# Patient Record
Sex: FEMALE | Employment: OTHER | URBAN - METROPOLITAN AREA
[De-identification: names, ages, dates, MRNs, and addresses within clinical notes are randomized per-mention and may not be internally consistent; named-entity substitution may affect disease eponyms.]

---

## 2017-01-02 ENCOUNTER — HOSPITAL ENCOUNTER (OUTPATIENT)
Age: 77
Discharge: HOME | End: 2017-01-02
Attending: FAMILY MEDICINE

## 2017-01-02 VITALS
TEMPERATURE: 98 F | BODY MASS INDEX: 19.97 KG/M2 | HEIGHT: 64 IN | DIASTOLIC BLOOD PRESSURE: 73 MMHG | WEIGHT: 117 LBS | OXYGEN SATURATION: 98 % | SYSTOLIC BLOOD PRESSURE: 125 MMHG | HEART RATE: 79 BPM | RESPIRATION RATE: 20 BRPM

## 2017-01-02 DIAGNOSIS — S61.012A LACERATION OF LEFT THUMB, INITIAL ENCOUNTER: Primary | ICD-10-CM

## 2017-01-02 PROCEDURE — 99202 OFFICE O/P NEW SF 15 MIN: CPT

## 2017-01-03 NOTE — ED NOTES
Wound care performed to left thumb. Left thumb dressed with xeroform and tube gauze. Patient tolerated well.

## 2017-01-03 NOTE — ED PROVIDER NOTES
Patient Seen in: [address]    History   Patient presents with:  Upper Extremity Injury (musculoskeletal)    Stated Complaint: LEFT THUMB, NEEDS STITCHES    HPI    Patient presents with a laceration to her left thumb nicholas Hypertension Brother      CAD and CABG in 46s   • Heart Disorder Brother    • Hypertension Brother      CAD and CABG in 46s   • Heart Disorder Brother          Smoking Status: Former Smoker                   Packs/Day: 0.00  Years:           Types: Cigaret mannie. Manuel Harp of IP joint was normal.  There were no deep structures involved. No tendon injury was identified. The wound was not a candidate for sutures or glue. Pressure dressing with Xeroform and gauze was applied.     The patient tolerated the procedure

## 2017-01-03 NOTE — ED INITIAL ASSESSMENT (HPI)
Patient states slicing scalloped potatoes and sliced left thumb at 8146 today. Patient denies pain. Patient arrived with finger wrapped in gauze. Patient states moderate amount of bleeding noted to thumb at time of injury.   Patient states being on Plavi

## 2017-02-27 PROBLEM — I10 ESSENTIAL HYPERTENSION: Status: ACTIVE | Noted: 2017-02-27

## 2017-02-27 PROBLEM — H43.811 POSTERIOR VITREOUS DETACHMENT OF RIGHT EYE: Status: ACTIVE | Noted: 2017-02-27

## 2017-08-31 ENCOUNTER — LAB ENCOUNTER (OUTPATIENT)
Dept: LAB | Age: 77
End: 2017-08-31
Attending: INTERNAL MEDICINE
Payer: MEDICARE

## 2017-08-31 DIAGNOSIS — I25.10 CORONARY ATHEROSCLEROSIS OF NATIVE CORONARY ARTERY: Primary | ICD-10-CM

## 2017-08-31 DIAGNOSIS — E78.49 OTHER HYPERLIPIDEMIA: ICD-10-CM

## 2017-08-31 LAB
ALBUMIN SERPL-MCNC: 3.9 G/DL (ref 3.5–4.8)
ALP LIVER SERPL-CCNC: 75 U/L (ref 55–142)
ALT SERPL-CCNC: 37 U/L (ref 14–54)
AST SERPL-CCNC: 26 U/L (ref 15–41)
BASOPHILS # BLD AUTO: 0.01 X10(3) UL (ref 0–0.1)
BASOPHILS NFR BLD AUTO: 0.2 %
BILIRUB SERPL-MCNC: 0.7 MG/DL (ref 0.1–2)
BUN BLD-MCNC: 14 MG/DL (ref 8–20)
CALCIUM BLD-MCNC: 9.1 MG/DL (ref 8.3–10.3)
CHLORIDE: 108 MMOL/L (ref 101–111)
CHOLEST SMN-MCNC: 129 MG/DL (ref ?–200)
CO2: 28 MMOL/L (ref 22–32)
CREAT BLD-MCNC: 0.78 MG/DL (ref 0.55–1.02)
EOSINOPHIL # BLD AUTO: 0.05 X10(3) UL (ref 0–0.3)
EOSINOPHIL NFR BLD AUTO: 1.1 %
ERYTHROCYTE [DISTWIDTH] IN BLOOD BY AUTOMATED COUNT: 13.1 % (ref 11.5–16)
GLUCOSE BLD-MCNC: 88 MG/DL (ref 70–99)
HAV IGM SER QL: 2.1 MG/DL (ref 1.7–3)
HCT VFR BLD AUTO: 43.5 % (ref 34–50)
HDLC SERPL-MCNC: 65 MG/DL (ref 45–?)
HDLC SERPL: 1.98 {RATIO} (ref ?–4.44)
HGB BLD-MCNC: 14 G/DL (ref 12–16)
IMMATURE GRANULOCYTE COUNT: 0.01 X10(3) UL (ref 0–1)
IMMATURE GRANULOCYTE RATIO %: 0.2 %
LDLC SERPL CALC-MCNC: 50 MG/DL (ref ?–130)
LDLC SERPL-MCNC: 14 MG/DL (ref 5–40)
LYMPHOCYTES # BLD AUTO: 1.05 X10(3) UL (ref 0.9–4)
LYMPHOCYTES NFR BLD AUTO: 22.7 %
M PROTEIN MFR SERPL ELPH: 7 G/DL (ref 6.1–8.3)
MCH RBC QN AUTO: 34.1 PG (ref 27–33.2)
MCHC RBC AUTO-ENTMCNC: 32.2 G/DL (ref 31–37)
MCV RBC AUTO: 106.1 FL (ref 81–100)
MONOCYTES # BLD AUTO: 0.47 X10(3) UL (ref 0.1–0.6)
MONOCYTES NFR BLD AUTO: 10.2 %
NEUTROPHIL ABS PRELIM: 3.03 X10 (3) UL (ref 1.3–6.7)
NEUTROPHILS # BLD AUTO: 3.03 X10(3) UL (ref 1.3–6.7)
NEUTROPHILS NFR BLD AUTO: 65.6 %
NONHDLC SERPL-MCNC: 64 MG/DL (ref ?–130)
PLATELET # BLD AUTO: 215 10(3)UL (ref 150–450)
POTASSIUM SERPL-SCNC: 4.4 MMOL/L (ref 3.6–5.1)
RBC # BLD AUTO: 4.1 X10(6)UL (ref 3.8–5.1)
RED CELL DISTRIBUTION WIDTH-SD: 50.8 FL (ref 35.1–46.3)
SODIUM SERPL-SCNC: 143 MMOL/L (ref 136–144)
TRIGLYCERIDES: 69 MG/DL (ref ?–150)
TSI SER-ACNC: 2.18 MIU/ML (ref 0.35–5.5)
WBC # BLD AUTO: 4.6 X10(3) UL (ref 4–13)

## 2017-08-31 PROCEDURE — 80053 COMPREHEN METABOLIC PANEL: CPT

## 2017-08-31 PROCEDURE — 80061 LIPID PANEL: CPT

## 2017-08-31 PROCEDURE — 83735 ASSAY OF MAGNESIUM: CPT

## 2017-08-31 PROCEDURE — 85025 COMPLETE CBC W/AUTO DIFF WBC: CPT

## 2017-08-31 PROCEDURE — 84443 ASSAY THYROID STIM HORMONE: CPT

## 2019-03-25 PROBLEM — H43.811 POSTERIOR VITREOUS DETACHMENT OF RIGHT EYE: Status: RESOLVED | Noted: 2017-02-27 | Resolved: 2019-03-25

## 2019-09-26 PROBLEM — M79.2 NEURALGIA: Status: ACTIVE | Noted: 2019-09-26

## 2023-07-06 ENCOUNTER — OFFICE VISIT (OUTPATIENT)
Dept: WOUND CARE | Facility: HOSPITAL | Age: 83
End: 2023-07-06
Attending: NURSE PRACTITIONER
Payer: MEDICARE

## 2023-07-06 VITALS
SYSTOLIC BLOOD PRESSURE: 118 MMHG | DIASTOLIC BLOOD PRESSURE: 79 MMHG | HEIGHT: 64 IN | BODY MASS INDEX: 18.78 KG/M2 | TEMPERATURE: 98 F | HEART RATE: 71 BPM | RESPIRATION RATE: 16 BRPM | OXYGEN SATURATION: 95 % | WEIGHT: 110 LBS

## 2023-07-06 DIAGNOSIS — L97.922: ICD-10-CM

## 2023-07-06 DIAGNOSIS — C44.729 SQUAMOUS CELL CARCINOMA OF SKIN OF LEFT LOWER EXTREMITY: Primary | ICD-10-CM

## 2023-07-06 PROBLEM — L97.923 NON-PRESSURE CHRONIC ULCER OF LEFT LOWER LEG WITH NECROSIS OF MUSCLE (HCC): Status: ACTIVE | Noted: 2023-07-06

## 2023-07-06 PROCEDURE — 99214 OFFICE O/P EST MOD 30 MIN: CPT

## 2023-07-12 ENCOUNTER — NURSE ONLY (OUTPATIENT)
Dept: WOUND CARE | Facility: HOSPITAL | Age: 83
End: 2023-07-12
Attending: NURSE PRACTITIONER
Payer: MEDICARE

## 2023-07-12 VITALS
RESPIRATION RATE: 20 BRPM | HEART RATE: 94 BPM | TEMPERATURE: 98 F | OXYGEN SATURATION: 98 % | DIASTOLIC BLOOD PRESSURE: 89 MMHG | SYSTOLIC BLOOD PRESSURE: 142 MMHG

## 2023-07-12 DIAGNOSIS — L97.922: Primary | ICD-10-CM

## 2023-07-12 PROCEDURE — 97608 NEG PRS WND THER NDME>50SQCM: CPT

## 2023-07-15 ENCOUNTER — HOSPITAL ENCOUNTER (EMERGENCY)
Facility: HOSPITAL | Age: 83
Discharge: HOME | End: 2023-07-15
Attending: EMERGENCY MEDICINE
Payer: MEDICARE

## 2023-07-15 VITALS
HEART RATE: 92 BPM | DIASTOLIC BLOOD PRESSURE: 75 MMHG | TEMPERATURE: 99 F | HEIGHT: 64 IN | BODY MASS INDEX: 18.78 KG/M2 | SYSTOLIC BLOOD PRESSURE: 113 MMHG | RESPIRATION RATE: 18 BRPM | OXYGEN SATURATION: 95 % | WEIGHT: 110 LBS

## 2023-07-15 DIAGNOSIS — Z51.89 VISIT FOR WOUND CHECK: Primary | ICD-10-CM

## 2023-07-15 PROCEDURE — 99283 EMERGENCY DEPT VISIT LOW MDM: CPT

## 2023-07-15 NOTE — ED QUICK NOTES
Crn contacted regarding trying to get a hold of staff from wound care clinic to address non-working wound vac. Per crn, d/t no on-call wound care clinic staff during the weekends, wound vac would have to be removed, apply wet-to-dry dressing (send additional supplies home with pt), and have pt follow-up with wound care clinic on Monday. Pt and edmd informed.

## 2023-07-15 NOTE — ED QUICK NOTES
Pt's wound vac removed by ed rn at bedside. Error code on wound vac is indicating \"dressing full. \" Ed pct's at bedside to apply wet-to-dry dressing. Pt education provided at bedside so pt able to change dressing tomorrow. Pt to be sent home with additional supplies prior to discharge.

## 2023-07-15 NOTE — ED INITIAL ASSESSMENT (HPI)
Pt presents to ED for a malfunctioning wound vac since last night. Pt wound vac has been flashing red since last night. Denies drainage or any symptoms at this time.

## 2023-07-18 ENCOUNTER — OFFICE VISIT (OUTPATIENT)
Dept: WOUND CARE | Facility: HOSPITAL | Age: 83
End: 2023-07-18
Attending: NURSE PRACTITIONER
Payer: MEDICARE

## 2023-07-18 VITALS — OXYGEN SATURATION: 96 % | RESPIRATION RATE: 18 BRPM | HEART RATE: 73 BPM | TEMPERATURE: 97 F

## 2023-07-18 DIAGNOSIS — C44.729 SQUAMOUS CELL CARCINOMA OF SKIN OF LEFT LOWER EXTREMITY: ICD-10-CM

## 2023-07-18 DIAGNOSIS — L97.923 NON-PRESSURE CHRONIC ULCER OF LEFT LOWER LEG WITH NECROSIS OF MUSCLE (HCC): Primary | ICD-10-CM

## 2023-07-18 PROCEDURE — 99213 OFFICE O/P EST LOW 20 MIN: CPT | Performed by: NURSE PRACTITIONER

## 2023-07-19 ENCOUNTER — APPOINTMENT (OUTPATIENT)
Dept: WOUND CARE | Facility: HOSPITAL | Age: 83
End: 2023-07-19
Attending: NURSE PRACTITIONER
Payer: MEDICARE

## 2023-07-20 ENCOUNTER — TELEPHONE (OUTPATIENT)
Dept: WOUND CARE | Facility: HOSPITAL | Age: 83
End: 2023-07-20

## 2023-07-20 ENCOUNTER — APPOINTMENT (OUTPATIENT)
Dept: WOUND CARE | Facility: HOSPITAL | Age: 83
End: 2023-07-20
Attending: NURSE PRACTITIONER
Payer: MEDICARE

## 2023-07-20 ENCOUNTER — TELEPHONE (OUTPATIENT)
Dept: SURGERY | Facility: CLINIC | Age: 83
End: 2023-07-20

## 2023-07-20 NOTE — TELEPHONE ENCOUNTER
Left voice message to call the office to reschedule appointment 7/24/23, per Dr. Yvon Francis will not be available.

## 2023-07-20 NOTE — TELEPHONE ENCOUNTER
Left voice message per Dr. Rawleigh Boeck need to reschedule appointment 7/24/23. Made aware to call the office to reschedule.

## 2023-07-21 NOTE — TELEPHONE ENCOUNTER
Spoke with pt made aware Dr. Ramón Leung will be available for appointment scheduled 7/24/23 at 120pm, reminded to arrive 15 minutes early for appointment. Pt verbalized understanding.

## 2023-07-24 ENCOUNTER — OFFICE VISIT (OUTPATIENT)
Dept: SURGERY | Facility: CLINIC | Age: 83
End: 2023-07-24

## 2023-07-24 DIAGNOSIS — S81.802A UNSPECIFIED OPEN WOUND, LEFT LOWER LEG, INITIAL ENCOUNTER: Primary | ICD-10-CM

## 2023-07-24 PROCEDURE — 1159F MED LIST DOCD IN RCRD: CPT | Performed by: PLASTIC SURGERY

## 2023-07-24 PROCEDURE — 99205 OFFICE O/P NEW HI 60 MIN: CPT | Performed by: PLASTIC SURGERY

## 2023-07-24 PROCEDURE — 1126F AMNT PAIN NOTED NONE PRSNT: CPT | Performed by: PLASTIC SURGERY

## 2023-07-24 RX ORDER — HYDROCODONE BITARTRATE AND ACETAMINOPHEN 7.5; 325 MG/1; MG/1
TABLET ORAL
Qty: 25 TABLET | Refills: 0 | Status: SHIPPED | OUTPATIENT
Start: 2023-07-24

## 2023-07-24 NOTE — PROGRESS NOTES
Per MD, applied silverlon dressing, bulkry gauze and ace bandage dressing to L lower leg. Pt verbalized dressing fits comfortably.

## 2023-07-24 NOTE — H&P
Michael Tejeda is a 82year old female that presents with Patient presents with:  Wound Care: L anterior leg  . REFERRED BY:  Anayeli Capellan      Pacemaker: No  Latex Allergy: no  Coumadin: No  Plavix: Yes  Other anticoagulants: No  Cardiac stents: Yes    HAND DOMINANCE:  Left  Profession: Retired    RECONSTRUCTIVE HISTORY    SUN EXPOSURE   Current no   Past yes   Sunburns yes   Tanning salons current no   Tanning salons past no   Radiation treatments No     SKIN CANCER    Personal history of skin cancer: basal cell carcinoma, squamous cell carcinoma    HAND     Previous hand injury (personal)    No      HPI:     Surgery 1: LICHON:  MOHS  SCC L leg  - Date: 11/10/22  - Days Since: 115    Surgery 2: LICHON:  Suture of le leg wound  - Date: 05/11/23  - Days Since: 76        82year-old female with a nonhealing Mohs wound left leg    . Over 8 months ago she had SCC with nonhealing    Initial resection was left open. No suturing or grafting. Treated with topicals    11 May 2023, had some sort of revision procedure with suturing.   Wound dehisced    Has had a multitude of treatments including Silvadene, vinegar soaks, antibiotics    Currently on silver alginate changed weekly    No pain    No history of claudication           Review of Systems:   Constitutional: No change in appetite, chill/rigors, or fatigue  GI: No jaundice  Endocrine: No generalized weakness  Neurological: No aphasia, loss of consciousness, or seizures      Integumentary:     WOUND     Duration 11/10/23    Location L anterior lower leg     Mechanism Post Mohs SCC surgery on 11/10/23 and revision done on 5/11/23    Pain  None    Treatment Pt has been treating it with silvadene, vinegar soaks, and was taking oral antibiotics but stopped after going to the [facility]     Local care Pt is seeing the [facility] once a week their changing pt's dressings with Silver alginate and and bulky ace bandage dressings     Wound is ~ 2 x 2.5 cm with purulent drainage, foul odor, exposed muscle and tendon   PMH:     MEDICAL  Past Medical History:   Diagnosis Date    BCC (basal cell carcinoma of skin)     and SCC    Cataract     Coronary artery disease     Heart disease     High blood pressure     Osteopenia     Other and unspecified hyperlipidemia     Posterior vitreous detachment of right eye 2017        SURGICAL  Past Surgical History:   Procedure Laterality Date    ANGIOPLASTY (CORONARY)      stent LAD          CATARACT Bilateral 2018    COLONOSCOPY  , Dec 2016    normal.    OTHER SURGICAL HISTORY      LASIK    OTHER SURGICAL HISTORY  2014    stents in LAD    OTHER SURGICAL HISTORY      [address] removed from R lower lid    SKIN SURGERY  10/14/2019    C&D SCC Left inferior cheek Dr. See Salmeron [[facility] *    NAUSEA ONLY     MEDICATIONS  Current Outpatient Medications   Medication Sig Dispense Refill    Propylene Glycol (SYSTANE COMPLETE) 0.6 % Ophthalmic Solution       Polyethyl Glycol-Propyl Glycol (SYSTANE) 0.4-0.3 % Ophthalmic Solution Place 1 drop into both eyes daily. metoprolol Tartrate 25 MG Oral Tab TK 1/2 T PO QAM ALONG WITH METOPROLOL SUCCINATE  2    ATORVASTATIN 40 MG Oral Tab TAKE 1 TABLET BY MOUTH DAILY AT BEDTIME 90 tablet 2    Clopidogrel Bisulfate 75 MG Oral Tab Take 1 tablet (75 mg total) by mouth once daily. 90 tablet 0    METOPROLOL SUCCINATE ER 25 MG Oral Tablet 24 Hr TAKE 1 TABLET(25 MG) BY MOUTH DAILY 90 tablet 3    Cholecalciferol (VITAMIN D3) 1000 UNITS Oral Cap Take 1 tablet by mouth daily. SOCIAL HISTORY  Social History     Socioeconomic History    Marital status:    Tobacco Use    Smoking status: Former     Types: Cigarettes     Quit date:      Years since quittin.5    Smokeless tobacco: Never    Tobacco comments:     quit    Vaping Use    Vaping Use: Never used   Substance and Sexual Activity    Alcohol use:  Yes     Alcohol/week: 2.0 standard drinks of alcohol     Types: 2 Glasses of wine per week    Drug use: No   Other Topics Concern    Left Handed Yes        FAMILY HISTORY  Family History   Problem Relation Age of Onset    Hypertension Sister         CABG in 76s    Heart Disorder Sister     Hypertension Brother         CAD and CABG in 46s    Heart Disorder Brother     Hypertension Brother         CAD and CABG in 46s    Heart Disorder Brother     Dementia Mother           PHYSICAL EXAM:     CONSTITUTIONAL: Overall appearance - Normal  HEENT: Normocephalic  EYES: Conjunctiva - Right: Normal, Left: Normal; EOMI  EARS: Inspection - Right: Normal, Left: Normal  NECK/THYROID: Inspection - Normal, Palpation - Normal, Thyroid gland - Normal, No adenopathy  RESPIRATORY: Inspection - Normal, Effort - Normal  CARDIOVASCULAR: Regular rhythm, No murmurs  ABDOMEN: Inspection - Normal, No abdominal tenderness  NEURO: Memory intact  PSYCH: Oriented to person, place, time, and situation, Appropriate mood and affect      Physical Exam:       Left leg dorsalis pedis 1+, posterior tibial not palpable    Some varicosities, but no gross venous insufficiency    2-1/2 x 1-1/2 anterolateral leg wound with exposed tibialis anterior muscle  Epibole circumferentially    Undermining medial 1/2 cm, superior 1 cm, superolateral 1 cm          ASSESSMENT/PLAN:       WOUND(S) left leg, post Mohs      Risk Factors which may Negatively Impact Healing    Age, Anatomic location, and Venous insufficiency        We had a long discussion. I explained to the patient the nature of the wound(s) and we had a long discussion regarding management of this wound(s). I explained treatment options and answered the patient's questions. The patient wishes to proceed with wound management. If this heals, the area will have a permanent scar. This a complex, serious wound, with the potential for impacting on bodily function.     Even with satisfactory management this wound may never heal.    TREATMENT:    This needs wound excision of all unhealthy tissue, epiboly, undermining. This will be followed by dressing changes and possibly a VAC dressing. She will ultimately require skin grafting, if we can encourage granulation tissue. RISKS:     Bleeding  Infection  Scar  Nonhealing of wound  Need for further surgery  Anesthesia risks    The patient is on Plavix, and will stay on the anticoagulant during the perioperative period. We will take special precautions in surgery, and we may use drains. The patient runs a higher risk of bleeding and bruising, but these risks are outweighed by the risks of taking the patient off of anticoagulation.         7/24/2023  Mark George MD     [facility] Data Reviewed.             +++++++++++++++++++++++++++++++++++++++++++++++++    MEDICAL DECISION MAKING    PROBLEMS      HIGH    (number / complexity)          Complex wound with threat to bodily function    DATA         STRAIGHTFORWARD    (amount / complexity)           MANAGEMENT RISK  HIGH    (complications/ morbidity)       Major surgery with risk factors                  MDM LEVEL    MODERATE

## 2023-07-24 NOTE — H&P (VIEW-ONLY)
Ariel Tejeda is a 82year old female that presents with Patient presents with:  Wound Care: L anterior leg  . REFERRED BY:  Rico Shaikh      Pacemaker: No  Latex Allergy: no  Coumadin: No  Plavix: Yes  Other anticoagulants: No  Cardiac stents: Yes    HAND DOMINANCE:  Left  Profession: Retired    RECONSTRUCTIVE HISTORY    SUN EXPOSURE   Current no   Past yes   Sunburns yes   Tanning salons current no   Tanning salons past no   Radiation treatments No     SKIN CANCER    Personal history of skin cancer: basal cell carcinoma, squamous cell carcinoma    HAND     Previous hand injury (personal)    No      HPI:     Surgery 1: LICHON:  MOHS  SCC L leg  - Date: 11/10/22  - Days Since: 479    Surgery 2: LICHON:  Suture of le leg wound  - Date: 05/11/23  - Days Since: 76        82year-old female with a nonhealing Mohs wound left leg    . Over 8 months ago she had SCC with nonhealing    Initial resection was left open. No suturing or grafting. Treated with topicals    11 May 2023, had some sort of revision procedure with suturing.   Wound dehisced    Has had a multitude of treatments including Silvadene, vinegar soaks, antibiotics    Currently on silver alginate changed weekly    No pain    No history of claudication           Review of Systems:   Constitutional: No change in appetite, chill/rigors, or fatigue  GI: No jaundice  Endocrine: No generalized weakness  Neurological: No aphasia, loss of consciousness, or seizures      Integumentary:     WOUND     Duration 11/10/23    Location L anterior lower leg     Mechanism Post Mohs SCC surgery on 11/10/23 and revision done on 5/11/23    Pain  None    Treatment Pt has been treating it with silvadene, vinegar soaks, and was taking oral antibiotics but stopped after going to the [facility]     Local care Pt is seeing the [facility] once a week their changing pt's dressings with Silver alginate and and bulky ace bandage dressings     Wound is ~ 2 x 2.5 cm with purulent drainage, foul odor, exposed muscle and tendon   PMH:     MEDICAL  Past Medical History:   Diagnosis Date    BCC (basal cell carcinoma of skin)     and SCC    Cataract     Coronary artery disease     Heart disease     High blood pressure     Osteopenia     Other and unspecified hyperlipidemia     Posterior vitreous detachment of right eye 2017        SURGICAL  Past Surgical History:   Procedure Laterality Date    ANGIOPLASTY (CORONARY)      stent LAD          CATARACT Bilateral 2018    COLONOSCOPY  , Dec 2016    normal.    OTHER SURGICAL HISTORY      LASIK    OTHER SURGICAL HISTORY  2014    stents in LAD    OTHER SURGICAL HISTORY      [address] removed from R lower lid    SKIN SURGERY  10/14/2019    C&D SCC Left inferior cheek Dr. Annabelle Wilcox [[facility] *    NAUSEA ONLY     MEDICATIONS  Current Outpatient Medications   Medication Sig Dispense Refill    Propylene Glycol (SYSTANE COMPLETE) 0.6 % Ophthalmic Solution       Polyethyl Glycol-Propyl Glycol (SYSTANE) 0.4-0.3 % Ophthalmic Solution Place 1 drop into both eyes daily. metoprolol Tartrate 25 MG Oral Tab TK 1/2 T PO QAM ALONG WITH METOPROLOL SUCCINATE  2    ATORVASTATIN 40 MG Oral Tab TAKE 1 TABLET BY MOUTH DAILY AT BEDTIME 90 tablet 2    Clopidogrel Bisulfate 75 MG Oral Tab Take 1 tablet (75 mg total) by mouth once daily. 90 tablet 0    METOPROLOL SUCCINATE ER 25 MG Oral Tablet 24 Hr TAKE 1 TABLET(25 MG) BY MOUTH DAILY 90 tablet 3    Cholecalciferol (VITAMIN D3) 1000 UNITS Oral Cap Take 1 tablet by mouth daily. SOCIAL HISTORY  Social History     Socioeconomic History    Marital status:    Tobacco Use    Smoking status: Former     Types: Cigarettes     Quit date:      Years since quittin.5    Smokeless tobacco: Never    Tobacco comments:     quit    Vaping Use    Vaping Use: Never used   Substance and Sexual Activity    Alcohol use:  Yes     Alcohol/week: 2.0 standard drinks of alcohol     Types: 2 Glasses of wine per week    Drug use: No   Other Topics Concern    Left Handed Yes        FAMILY HISTORY  Family History   Problem Relation Age of Onset    Hypertension Sister         CABG in 76s    Heart Disorder Sister     Hypertension Brother         CAD and CABG in 46s    Heart Disorder Brother     Hypertension Brother         CAD and CABG in 46s    Heart Disorder Brother     Dementia Mother           PHYSICAL EXAM:     CONSTITUTIONAL: Overall appearance - Normal  HEENT: Normocephalic  EYES: Conjunctiva - Right: Normal, Left: Normal; EOMI  EARS: Inspection - Right: Normal, Left: Normal  NECK/THYROID: Inspection - Normal, Palpation - Normal, Thyroid gland - Normal, No adenopathy  RESPIRATORY: Inspection - Normal, Effort - Normal  CARDIOVASCULAR: Regular rhythm, No murmurs  ABDOMEN: Inspection - Normal, No abdominal tenderness  NEURO: Memory intact  PSYCH: Oriented to person, place, time, and situation, Appropriate mood and affect      Physical Exam:       Left leg dorsalis pedis 1+, posterior tibial not palpable    Some varicosities, but no gross venous insufficiency    2-1/2 x 1-1/2 anterolateral leg wound with exposed tibialis anterior muscle  Epibole circumferentially    Undermining medial 1/2 cm, superior 1 cm, superolateral 1 cm          ASSESSMENT/PLAN:       WOUND(S) left leg, post Mohs      Risk Factors which may Negatively Impact Healing    Age, Anatomic location, and Venous insufficiency        We had a long discussion. I explained to the patient the nature of the wound(s) and we had a long discussion regarding management of this wound(s). I explained treatment options and answered the patient's questions. The patient wishes to proceed with wound management. If this heals, the area will have a permanent scar. This a complex, serious wound, with the potential for impacting on bodily function.     Even with satisfactory management this wound may never heal.    TREATMENT:    This needs wound excision of all unhealthy tissue, epiboly, undermining. This will be followed by dressing changes and possibly a VAC dressing. She will ultimately require skin grafting, if we can encourage granulation tissue. RISKS:     Bleeding  Infection  Scar  Nonhealing of wound  Need for further surgery  Anesthesia risks    The patient is on Plavix, and will stay on the anticoagulant during the perioperative period. We will take special precautions in surgery, and we may use drains. The patient runs a higher risk of bleeding and bruising, but these risks are outweighed by the risks of taking the patient off of anticoagulation.         7/24/2023  Stephanie Flores MD     [facility] Data Reviewed.             +++++++++++++++++++++++++++++++++++++++++++++++++    MEDICAL DECISION MAKING    PROBLEMS      HIGH    (number / complexity)          Complex wound with threat to bodily function    DATA         STRAIGHTFORWARD    (amount / complexity)           MANAGEMENT RISK  HIGH    (complications/ morbidity)       Major surgery with risk factors                  MDM LEVEL    MODERATE

## 2023-07-25 ENCOUNTER — HOSPITAL ENCOUNTER (OUTPATIENT)
Facility: HOSPITAL | Age: 83
Setting detail: HOSPITAL OUTPATIENT SURGERY
Discharge: HOME | End: 2023-07-25
Attending: PLASTIC SURGERY | Admitting: PLASTIC SURGERY
Payer: MEDICARE

## 2023-07-25 ENCOUNTER — ANESTHESIA (OUTPATIENT)
Dept: SURGERY | Facility: HOSPITAL | Age: 83
End: 2023-07-25
Payer: MEDICARE

## 2023-07-25 ENCOUNTER — HOSPITAL DOCUMENTATION (OUTPATIENT)
Dept: SURGERY | Facility: CLINIC | Age: 83
End: 2023-07-25

## 2023-07-25 ENCOUNTER — ANESTHESIA EVENT (OUTPATIENT)
Dept: SURGERY | Facility: HOSPITAL | Age: 83
End: 2023-07-25
Payer: MEDICARE

## 2023-07-25 VITALS
SYSTOLIC BLOOD PRESSURE: 124 MMHG | OXYGEN SATURATION: 99 % | DIASTOLIC BLOOD PRESSURE: 63 MMHG | RESPIRATION RATE: 18 BRPM | WEIGHT: 109.63 LBS | HEART RATE: 71 BPM | HEIGHT: 63.5 IN | BODY MASS INDEX: 19.19 KG/M2 | TEMPERATURE: 97 F

## 2023-07-25 DIAGNOSIS — S81.802A UNSPECIFIED OPEN WOUND, LEFT LOWER LEG, INITIAL ENCOUNTER: ICD-10-CM

## 2023-07-25 DIAGNOSIS — S81.802A UNSPECIFIED OPEN WOUND, LEFT LOWER LEG, INITIAL ENCOUNTER: Primary | ICD-10-CM

## 2023-07-25 PROBLEM — Z04.9 OBSERVATION FOR SUSPECTED CONDITION: Status: ACTIVE | Noted: 2023-07-25

## 2023-07-25 PROCEDURE — 0HBLXZZ EXCISION OF LEFT LOWER LEG SKIN, EXTERNAL APPROACH: ICD-10-PCS | Performed by: PLASTIC SURGERY

## 2023-07-25 PROCEDURE — 15002 WOUND PREP TRK/ARM/LEG: CPT | Performed by: PLASTIC SURGERY

## 2023-07-25 PROCEDURE — 14301 TIS TRNFR ANY 30.1-60 SQ CM: CPT | Performed by: PLASTIC SURGERY

## 2023-07-25 PROCEDURE — 14302 TIS TRNFR ADDL 30 SQ CM: CPT | Performed by: PLASTIC SURGERY

## 2023-07-25 RX ORDER — HYDROCODONE BITARTRATE AND ACETAMINOPHEN 7.5; 325 MG/1; MG/1
1 TABLET ORAL EVERY 4 HOURS PRN
Status: DISCONTINUED | OUTPATIENT
Start: 2023-07-25 | End: 2023-07-25

## 2023-07-25 RX ORDER — GLYCOPYRROLATE 0.2 MG/ML
INJECTION, SOLUTION INTRAMUSCULAR; INTRAVENOUS AS NEEDED
Status: DISCONTINUED | OUTPATIENT
Start: 2023-07-25 | End: 2023-07-26 | Stop reason: SURG

## 2023-07-25 RX ORDER — HYDROMORPHONE HYDROCHLORIDE 1 MG/ML
0.2 INJECTION, SOLUTION INTRAMUSCULAR; INTRAVENOUS; SUBCUTANEOUS EVERY 5 MIN PRN
Status: DISCONTINUED | OUTPATIENT
Start: 2023-07-25 | End: 2023-07-25

## 2023-07-25 RX ORDER — SODIUM CHLORIDE, SODIUM LACTATE, POTASSIUM CHLORIDE, CALCIUM CHLORIDE 600; 310; 30; 20 MG/100ML; MG/100ML; MG/100ML; MG/100ML
INJECTION, SOLUTION INTRAVENOUS CONTINUOUS
Status: DISCONTINUED | OUTPATIENT
Start: 2023-07-25 | End: 2023-07-25

## 2023-07-25 RX ORDER — ONDANSETRON 2 MG/ML
INJECTION INTRAMUSCULAR; INTRAVENOUS AS NEEDED
Status: DISCONTINUED | OUTPATIENT
Start: 2023-07-25 | End: 2023-07-26 | Stop reason: SURG

## 2023-07-25 RX ORDER — ACETAMINOPHEN 500 MG
1000 TABLET ORAL ONCE
Status: COMPLETED | OUTPATIENT
Start: 2023-07-25 | End: 2023-07-25

## 2023-07-25 RX ORDER — MORPHINE SULFATE 4 MG/ML
4 INJECTION, SOLUTION INTRAMUSCULAR; INTRAVENOUS EVERY 10 MIN PRN
Status: DISCONTINUED | OUTPATIENT
Start: 2023-07-25 | End: 2023-07-25

## 2023-07-25 RX ORDER — MORPHINE SULFATE 4 MG/ML
2 INJECTION, SOLUTION INTRAMUSCULAR; INTRAVENOUS EVERY 10 MIN PRN
Status: DISCONTINUED | OUTPATIENT
Start: 2023-07-25 | End: 2023-07-25

## 2023-07-25 RX ORDER — CEFAZOLIN SODIUM/WATER 2 G/20 ML
2 SYRINGE (ML) INTRAVENOUS ONCE
Status: COMPLETED | OUTPATIENT
Start: 2023-07-25 | End: 2023-07-25

## 2023-07-25 RX ORDER — KETOROLAC TROMETHAMINE 30 MG/ML
INJECTION, SOLUTION INTRAMUSCULAR; INTRAVENOUS AS NEEDED
Status: DISCONTINUED | OUTPATIENT
Start: 2023-07-25 | End: 2023-07-26 | Stop reason: SURG

## 2023-07-25 RX ORDER — EPHEDRINE SULFATE 50 MG/ML
INJECTION INTRAVENOUS AS NEEDED
Status: DISCONTINUED | OUTPATIENT
Start: 2023-07-25 | End: 2023-07-26 | Stop reason: SURG

## 2023-07-25 RX ORDER — NALOXONE HYDROCHLORIDE 0.4 MG/ML
80 INJECTION, SOLUTION INTRAMUSCULAR; INTRAVENOUS; SUBCUTANEOUS AS NEEDED
Status: DISCONTINUED | OUTPATIENT
Start: 2023-07-25 | End: 2023-07-25

## 2023-07-25 RX ORDER — MORPHINE SULFATE 10 MG/ML
6 INJECTION, SOLUTION INTRAMUSCULAR; INTRAVENOUS EVERY 10 MIN PRN
Status: DISCONTINUED | OUTPATIENT
Start: 2023-07-25 | End: 2023-07-25

## 2023-07-25 RX ORDER — HYDROMORPHONE HYDROCHLORIDE 1 MG/ML
0.6 INJECTION, SOLUTION INTRAMUSCULAR; INTRAVENOUS; SUBCUTANEOUS EVERY 5 MIN PRN
Status: DISCONTINUED | OUTPATIENT
Start: 2023-07-25 | End: 2023-07-25

## 2023-07-25 RX ORDER — LIDOCAINE HYDROCHLORIDE 10 MG/ML
INJECTION, SOLUTION EPIDURAL; INFILTRATION; INTRACAUDAL; PERINEURAL AS NEEDED
Status: DISCONTINUED | OUTPATIENT
Start: 2023-07-25 | End: 2023-07-26 | Stop reason: SURG

## 2023-07-25 RX ORDER — HYDROMORPHONE HYDROCHLORIDE 1 MG/ML
0.4 INJECTION, SOLUTION INTRAMUSCULAR; INTRAVENOUS; SUBCUTANEOUS EVERY 5 MIN PRN
Status: DISCONTINUED | OUTPATIENT
Start: 2023-07-25 | End: 2023-07-25

## 2023-07-25 RX ADMIN — LIDOCAINE HYDROCHLORIDE 50 MG: 10 INJECTION, SOLUTION EPIDURAL; INFILTRATION; INTRACAUDAL; PERINEURAL at 14:57:00

## 2023-07-25 RX ADMIN — SODIUM CHLORIDE, SODIUM LACTATE, POTASSIUM CHLORIDE, CALCIUM CHLORIDE: 600; 310; 30; 20 INJECTION, SOLUTION INTRAVENOUS at 16:04:00

## 2023-07-25 RX ADMIN — CEFAZOLIN SODIUM/WATER 2 G: 2 G/20 ML SYRINGE (ML) INTRAVENOUS at 15:05:00

## 2023-07-25 RX ADMIN — EPHEDRINE SULFATE 5 MG: 50 INJECTION INTRAVENOUS at 15:16:00

## 2023-07-25 RX ADMIN — GLYCOPYRROLATE 0.2 MG: 0.2 INJECTION, SOLUTION INTRAMUSCULAR; INTRAVENOUS at 15:13:00

## 2023-07-25 RX ADMIN — KETOROLAC TROMETHAMINE 15 MG: 30 INJECTION, SOLUTION INTRAMUSCULAR; INTRAVENOUS at 16:04:00

## 2023-07-25 RX ADMIN — ONDANSETRON 4 MG: 2 INJECTION INTRAMUSCULAR; INTRAVENOUS at 15:50:00

## 2023-07-25 NOTE — INTERVAL H&P NOTE
Pre-op Diagnosis: Unspecified open wound, left lower leg, initial encounter [N08.757U]    The above referenced H&P was reviewed by Tyesha Daugherty MD on 7/25/2023, the patient was examined and no significant changes have occurred in the patient's condition since the H&P was performed. I discussed with the patient and/or legal representative the potential benefits, risks and side effects of this procedure; the likelihood of the patient achieving goals; and potential problems that might occur during recuperation. I discussed reasonable alternatives to the procedure, including risks, benefits and side effects related to the alternatives and risks related to not receiving this procedure. We will proceed with procedure as planned.

## 2023-07-25 NOTE — BRIEF OP NOTE
Pre-Operative Diagnosis: Unspecified open wound, left lower leg, initial encounter [S81.802A]     Post-Operative Diagnosis: Unspecified open wound, left lower leg, initial encounter [S81.802A]      Procedure Performed:   DEBRIDEMENT OF LEFT LEG AND FLAP CLOSURE    Surgeon(s) and Role:     * Garett Mccord MD - Primary    Assistant(s):        Surgical Findings: Wound     Specimen: Wound excision     Estimated Blood Loss: Blood Output: 10 mL (7/25/2023  4:06 PM)      Dictation Number:      Mark George MD  7/25/2023  4:11 PM

## 2023-07-25 NOTE — ANESTHESIA PROCEDURE NOTES
Airway  Date/Time: 7/25/2023 3:01 PM  Urgency: elective      General Information and Staff    Patient location during procedure: OR  Anesthesiologist: Tiffanie Cortez MD  Performed: anesthesiologist   Performed by: Tiffanie Cortez MD  Authorized by: Rukhsana Chambers MD      Indications and Patient Condition  Indications for airway management: anesthesia  Spontaneous Ventilation: absent  Sedation level: deep  Preoxygenated: yes  Patient position: sniffing  Mask difficulty assessment: 0 - not attempted    Final Airway Details  Final airway type: supraglottic airway      Successful airway: classic  Size 3       Number of attempts at approach: 1  Number of other approaches attempted: 0

## 2023-07-25 NOTE — DISCHARGE INSTRUCTIONS
Dr Ignacio Hernandez M.D.   [phone]  Plastic and Reconstructive Surgery, Hand Surgery  [address] [facility], [address]     GENERAL INSTRUCTIONS:  Do not remove dressing for any reason. Keep dressing clean and dry. Some drainage (blood and fluid) through the dressing is expected. Some swelling is normal.  Take medications as directed. Keep leg elevated on two pillows.            YOU HAVE AN APPOINTMENT AT THE OFFICE ON _______8/1/23_________    PLEASE CALL THE OFFICE _____________________________________     AND MAKE AN APPOINTMENT FOR ______________________________

## 2023-07-26 ENCOUNTER — APPOINTMENT (OUTPATIENT)
Dept: WOUND CARE | Facility: HOSPITAL | Age: 83
End: 2023-07-26
Attending: NURSE PRACTITIONER
Payer: MEDICARE

## 2023-07-26 ENCOUNTER — TELEPHONE (OUTPATIENT)
Dept: SURGERY | Facility: CLINIC | Age: 83
End: 2023-07-26

## 2023-07-26 NOTE — OPERATIVE REPORT
[facility]    PATIENT'S NAME: Chico Blanco   ATTENDING PHYSICIAN: Ulises Correa MD   OPERATING PHYSICIAN: Ulises Correa MD   PATIENT ACCOUNT#:   [de-identified]    LOCATION:  [place] [address]  MEDICAL RECORD #:   [id number]       YOB: 1940  ADMISSION DATE:       07/25/2023      OPERATION DATE:  07/25/2023    OPERATIVE REPORT      PREOPERATIVE DIAGNOSIS:  Left leg nonhealing wound. POSTOPERATIVE DIAGNOSIS:  Left leg nonhealing wound. PROCEDURE:  Left leg wound excision with excision of bursa, rotation flap reconstruction. INDICATIONS:  An 82-year-old female with a left leg wound. On 11/10/2022, she underwent Mohs surgery for a squamous cell carcinoma. The wound was not closed. It did not heal and continued to increase in size. On 05/11, a revisional procedure was performed which dehisced. She has a wound that has been treated with a number of topical agents including vinegar, Silvadene, silver alginate. Wound has tunneling and she is now admitted to the operating amphitheater for wound excision and reconstruction. FINDINGS:  A non-infected full-thickness wound measuring 2.5 x 1.5 cm is present. A bursa extends proximally, medially, and laterally approximately 1 to 2 cm. The tibialis anterior muscle is exposed but not infected or necrotic. Proximally the fascia over the muscle is exposed but not necrotic. There is no exposed bone. OPERATIVE TECHNIQUE:  Patient was placed under general anesthesia. The extremity was prepped and draped in the usual sterile fashion. We circumscribed the wound to excise the epibole. We excised the bursa from the deep tissues, as well as from the subcutaneous tissues, circumferentially to healthy tissue. We used a curet in some places. Meticulous hemostasis was achieved. We copiously irrigated the wound with saline. There was no further necrotic tissue and no infection.     We had anticipated packing the wound, ultimately placing a VAC and skin grafting but there was enough lax skin that we felt we could reconstruct the wound with soft tissue flaps. We incised proximally and elevated an anterior medial proximally-based flap measuring 10 x 3 cm which loosened the tissue and allowed it to rotate distally and laterally. Similarly, we incised distally and elevated a lateral and posteriorly-based flap measuring 10 x 3 cm which was advanced and rotated anteriorly and proximally to effect closure of the defect. Total defect, 72 sq cm:  Primary defect 2 sq cm plus secondary defect 70 sq cm (lateral defect 10 x 4 cm, medial defect 10 x 3 cm). Meticulous hemostasis was achieved, then we waited 5 minutes to assure ourselves of complete hemostasis as the patient is on Plavix. Rotated the flaps and secured them. The tissue was extremely friable so we distributed the subcutaneous and deep dermal sutures evenly to diminish tension. We used 2-0 Vicryl for subcutaneous and deep dermal sutures, then skin staples for skin. The flaps came together without tension, remained pink, viable, blanched, and had excellent capillary refill. Skin edges were reapproximated with staples. A wick drain was placed distally; a Penrose drain was placed proximally, (2 drains total). A bulky, soft dressing was placed. The patient tolerated the procedure well and left the operating suite in satisfactory condition.     Dictated By Dimple Smith MD  d: 07/25/2023 16:17:18  t: 07/25/2023 21:22:09  Job [id number]  J/    cc: MD Gary Sr APN

## 2023-07-26 NOTE — TELEPHONE ENCOUNTER
Spoke w/the patient   She states that she is having intermittent pain, rates 6/10, taking Tylenol, is somewhat helpful. Encouraged pt to take Norco if Tylenol not helping. Patient reminded to keep dressing clean and dry and not to remove it. Reminded pt to do minimal walking, keep leg elevated at all times, and full weight bearing. Patient reminded of RN appts on 8/1/23 and 8/8/23, and MD appt on 8/14/23. Patient verbalized an understanding. Patient instructed to call the office w/any other questions and/or concerns. Dr. Zhanna Reveles notified.

## 2023-07-27 ENCOUNTER — APPOINTMENT (OUTPATIENT)
Dept: WOUND CARE | Facility: HOSPITAL | Age: 83
End: 2023-07-27
Attending: NURSE PRACTITIONER
Payer: MEDICARE

## 2023-08-01 ENCOUNTER — NURSE ONLY (OUTPATIENT)
Dept: SURGERY | Facility: CLINIC | Age: 83
End: 2023-08-01

## 2023-08-01 DIAGNOSIS — Z48.03 CHANGE OR REMOVAL OF DRAINS: Primary | ICD-10-CM

## 2023-08-01 DIAGNOSIS — S81.802A UNSPECIFIED OPEN WOUND, LEFT LOWER LEG, INITIAL ENCOUNTER: ICD-10-CM

## 2023-08-01 PROCEDURE — 1159F MED LIST DOCD IN RCRD: CPT | Performed by: PLASTIC SURGERY

## 2023-08-01 PROCEDURE — 1126F AMNT PAIN NOTED NONE PRSNT: CPT | Performed by: PLASTIC SURGERY

## 2023-08-01 NOTE — PROGRESS NOTES
Surgery 1: LICHON:  MOHS  SCC L leg  - Date: 11/10/22  - Days Since: 580    Surgery 2: Michael Laos:  Suture of le leg wound  - Date: 23  - Days Since: 82    Surgery 3: L leg debridement / flap (2 drains)  - Date: 23  - Days Since: 7  Pt is in the office today for Nurse visit for drain removal L leg. Pt identified by name and . Orders reviewed. Denies pain and need for analgesics. Ambulating without difficulty. Gait steady. L leg ace wrap CDI,removed all dressings, moderate amount of dried blood on internal dressings. 2 penrose drains removed without difficulty. Pt tolerated well. Incision with staples CDI,edges well approximated without s/s of infection. L pedal pulse palpable  Redressed incision with xeroform,gauzes,an ABD,kerlix,spandage and ace wrap. Pt states feels secure and not too tight. Reminded to keep dressing CDI,elevate at all times,minimal walking,next appointment 23 with RN and please call with any questions and/or concerns. Verbalized understanding. Dr Amadeo Myers notified. No pain  Ambulating without difficulty. Drain X 2 removed. Incision with staples healing well without s/s of infection. Redressed per orders.   23 STEFFEN YAN  23 MD

## 2023-08-02 ENCOUNTER — APPOINTMENT (OUTPATIENT)
Dept: WOUND CARE | Facility: HOSPITAL | Age: 83
End: 2023-08-02
Attending: NURSE PRACTITIONER
Payer: MEDICARE

## 2023-08-03 ENCOUNTER — APPOINTMENT (OUTPATIENT)
Dept: WOUND CARE | Facility: HOSPITAL | Age: 83
End: 2023-08-03
Attending: NURSE PRACTITIONER
Payer: MEDICARE

## 2023-08-08 ENCOUNTER — NURSE ONLY (OUTPATIENT)
Dept: SURGERY | Facility: CLINIC | Age: 83
End: 2023-08-08

## 2023-08-08 DIAGNOSIS — Z48.89 ENCOUNTER FOR POST SURGICAL WOUND CHECK: Primary | ICD-10-CM

## 2023-08-08 PROCEDURE — 1126F AMNT PAIN NOTED NONE PRSNT: CPT | Performed by: PLASTIC SURGERY

## 2023-08-08 PROCEDURE — 1159F MED LIST DOCD IN RCRD: CPT | Performed by: PLASTIC SURGERY

## 2023-08-08 NOTE — PROGRESS NOTES
Surgery 1: HERMELINDAON:  MOHS  SCC L leg  - Date: 11/10/22  - Days Since: 389    Surgery 2: Rebekahnicholas Alejandra:  Suture of le leg wound  - Date: 23  - Days Since: 89    Surgery 3: L leg debridement / flap (2 drains)  - Date: 23  - Days Since: 14  Pt is in the office today for Nurse visit for staple removal L leg. Pt identified by name and . Orders reviewed. Gait steady  Denies pain and need for analgesics. L foot warm to touch and pink. L leg ace wrap CDI,removed. L leg incision with staples CDI,edges well approximated without s/s of infection,scabbing present. Inferior scar with 1 small open area between staples draining serous sanguinous drainage. Unidentifiable photo sent to Dr Shola Baires for review and pt update given. Per telephone order from Dr Shola Baires davida to remain in and pt to start washing daily with soap and water then apply polysporin,gauze and spandage. Pt given written,verbal and demonstration of home wound care. Reminded next appointment 23 with MD  Instructed to call the office with any s/s of infection,questions and/or concerns. Verbalized understanding. Dr Shola Baires notified. No pain  Incision with staples CDI, with 1 small open area inferiorly between staples with serous sanguinous drainage. No s/s of infection  Staples to remain in.   Daily wash,polysporin,gauze,spandage  23 MD

## 2023-08-14 ENCOUNTER — OFFICE VISIT (OUTPATIENT)
Dept: SURGERY | Facility: CLINIC | Age: 83
End: 2023-08-14

## 2023-08-14 DIAGNOSIS — S81.802A UNSPECIFIED OPEN WOUND, LEFT LOWER LEG, INITIAL ENCOUNTER: Primary | ICD-10-CM

## 2023-08-14 PROCEDURE — 99024 POSTOP FOLLOW-UP VISIT: CPT | Performed by: PLASTIC SURGERY

## 2023-08-14 PROCEDURE — 1126F AMNT PAIN NOTED NONE PRSNT: CPT | Performed by: PLASTIC SURGERY

## 2023-08-14 PROCEDURE — 1159F MED LIST DOCD IN RCRD: CPT | Performed by: PLASTIC SURGERY

## 2023-08-14 NOTE — PROGRESS NOTES
Surgery 1: JARRET:  MOHS  SCC L leg  - Date: 11/10/22  - Days Since: 722    Surgery 2: Ewsley Brandynmargarita:  Suture of le leg wound  - Date: 05/11/23  - Days Since: 95    Surgery 3: L leg debridement / flap (2 drains)  - Date: 07/25/23  - Days Since: 20    Pt here for L leg debridement follow up  LOV was RN only on 8/8/23 for wound check  Pt denies pain, s/s infection  Washing daily w/ soap and water, applying Polysporin, gauze, Spandage  L leg incision w/ staples C/D/I, inferior incision with one small open area between staples  Small amount serous drainage on gauze. Slight erythema, not warm to touch      20 days postop  No complaints. No pain.     Some crusting in the incision, but no infection    Distally 8 mm open area with granulation    Silver nitrate cautery    Continue daily washings, Polysporin, gauze, Spandage    1 week

## 2023-08-14 NOTE — PROGRESS NOTES
Surgery 1: LICHON:  MOHS  SCC L leg  - Date: 11/10/22  - Days Since: 956    Surgery 2: LICHON:  Suture of le leg wound  - Date: 05/11/23  - Days Since: 95    Surgery 3: L leg debridement / flap (2 drains)  - Date: 07/25/23  - Days Since: 20    Per Dr Jordan Gonsalez verbal order, pt is to continue washing L leg incision daily with soap and water, applying Polysporin, gauze, and Spandage. Redressed L leg incision w/ Polysporin, gauze, Spandage. Pt will make a follow up appt w/ Dr Jordan Gonsalez for next week. Pt reminded to call the office if any increase in pain or s/s infection. Pt verbalized understanding.

## 2023-08-21 ENCOUNTER — OFFICE VISIT (OUTPATIENT)
Dept: SURGERY | Facility: CLINIC | Age: 83
End: 2023-08-21

## 2023-08-21 DIAGNOSIS — S81.802A UNSPECIFIED OPEN WOUND, LEFT LOWER LEG, INITIAL ENCOUNTER: Primary | ICD-10-CM

## 2023-08-21 PROCEDURE — 1159F MED LIST DOCD IN RCRD: CPT | Performed by: PLASTIC SURGERY

## 2023-08-21 PROCEDURE — 1126F AMNT PAIN NOTED NONE PRSNT: CPT | Performed by: PLASTIC SURGERY

## 2023-08-21 PROCEDURE — 99024 POSTOP FOLLOW-UP VISIT: CPT | Performed by: PLASTIC SURGERY

## 2023-08-21 NOTE — PROGRESS NOTES
Surgery 1: LICHON:  MOHS  SCC L leg  - Date: 11/10/22  - Days Since: 914    Surgery 2: HERMELINDAON:  Suture of le leg wound  - Date: 05/11/23  - Days Since: 102    Surgery 3: L leg debridement / flap (2 drains)  - Date: 07/25/23  - Days Since: 27    Washing daily with soap and water, polysporin, gauze, spandage       27 days postop    No complaints. No pain. Incision looks excellent  1 cm open area distally, with less tunneling, no infection  Silver nitrate    Staples removed    To [address] [place] [address] for topical care    Discharged. To call if any problems or concerns.

## 2023-08-21 NOTE — PROGRESS NOTES
Surgery 1: JARRET:  MOHS  SCC L leg  - Date: 11/10/22  - Days Since: 125    Surgery 2: Cyndie Garsia:  Suture of le leg wound  - Date: 05/11/23  - Days Since: 102    Surgery 3: L leg debridement / flap (2 drains)  - Date: 07/25/23  - Days Since: 27    Per Dr Naya Brasher verbal order L lower leg staples removed without difficulty and patient tolerated procedure well. Scar cleansed with soap and water. Redressed wound w/ Polysporin, gauze, and Spandage. Pt instructed to make an appt w/ wound clinic per Dr Naya Brasher verbal order. Pt instructed to continue washing daily, applying Polysporin, gauze, and Spandage until seen in wound clinic. Pt instructed to call the office w/ any further concerns or questions. Pt verbalized understanding.

## 2023-09-01 ENCOUNTER — OFFICE VISIT (OUTPATIENT)
Dept: WOUND CARE | Facility: HOSPITAL | Age: 83
End: 2023-09-01
Attending: FAMILY MEDICINE
Payer: MEDICARE

## 2023-09-01 VITALS
TEMPERATURE: 98 F | RESPIRATION RATE: 17 BRPM | OXYGEN SATURATION: 96 % | SYSTOLIC BLOOD PRESSURE: 121 MMHG | DIASTOLIC BLOOD PRESSURE: 71 MMHG | HEART RATE: 77 BPM

## 2023-09-01 DIAGNOSIS — S81.802A UNSPECIFIED OPEN WOUND, LEFT LOWER LEG, INITIAL ENCOUNTER: Primary | ICD-10-CM

## 2023-09-01 PROCEDURE — 99204 OFFICE O/P NEW MOD 45 MIN: CPT | Performed by: FAMILY MEDICINE

## 2023-09-01 NOTE — PATIENT INSTRUCTIONS
PATIENT INSTRUCTIONS      FOR Joseharjit LemonKARLA moncada 1940    DATE OF SERVICE: 2023        Apply Medihoney gel to both wounds    Cover with border gauze dressing, we will order a silicone border dressing which is easier to take off, you should expect a delivery to your home. Change the dressing on a daily basis      You may shower and clean the wound with soap and water but use saline spray after showering.     No soaking of the wound underwater    You may wear the spandagrip compression stocking in the daytime and remove at nighttime if desired      Follow up with Dr. Mayo Elder 2 WEEKS

## 2023-09-05 ENCOUNTER — PATIENT OUTREACH (OUTPATIENT)
Dept: CASE MANAGEMENT | Age: 83
End: 2023-09-05

## 2023-09-15 ENCOUNTER — OFFICE VISIT (OUTPATIENT)
Dept: WOUND CARE | Facility: HOSPITAL | Age: 83
End: 2023-09-15
Attending: FAMILY MEDICINE
Payer: MEDICARE

## 2023-09-15 VITALS
DIASTOLIC BLOOD PRESSURE: 74 MMHG | OXYGEN SATURATION: 99 % | HEART RATE: 65 BPM | SYSTOLIC BLOOD PRESSURE: 138 MMHG | TEMPERATURE: 97 F | RESPIRATION RATE: 20 BRPM

## 2023-09-15 DIAGNOSIS — S81.802A UNSPECIFIED OPEN WOUND, LEFT LOWER LEG, INITIAL ENCOUNTER: Primary | ICD-10-CM

## 2023-09-15 PROCEDURE — 99215 OFFICE O/P EST HI 40 MIN: CPT | Performed by: FAMILY MEDICINE

## 2023-09-15 RX ORDER — CEPHALEXIN 500 MG/1
500 CAPSULE ORAL 2 TIMES DAILY
Qty: 20 CAPSULE | Refills: 0 | Status: SHIPPED | OUTPATIENT
Start: 2023-09-15 | End: 2023-09-25

## 2023-09-22 ENCOUNTER — OFFICE VISIT (OUTPATIENT)
Dept: WOUND CARE | Facility: HOSPITAL | Age: 83
End: 2023-09-22
Attending: FAMILY MEDICINE
Payer: MEDICARE

## 2023-09-22 VITALS
SYSTOLIC BLOOD PRESSURE: 110 MMHG | OXYGEN SATURATION: 98 % | DIASTOLIC BLOOD PRESSURE: 68 MMHG | RESPIRATION RATE: 18 BRPM | HEART RATE: 68 BPM | TEMPERATURE: 97 F

## 2023-09-22 DIAGNOSIS — S81.802A UNSPECIFIED OPEN WOUND, LEFT LOWER LEG, INITIAL ENCOUNTER: Primary | ICD-10-CM

## 2023-09-22 DIAGNOSIS — L97.923 NON-PRESSURE CHRONIC ULCER OF LEFT LOWER LEG WITH NECROSIS OF MUSCLE (HCC): ICD-10-CM

## 2023-09-22 DIAGNOSIS — Z85.89 HISTORY OF SQUAMOUS CELL CARCINOMA: ICD-10-CM

## 2023-09-22 PROCEDURE — 99214 OFFICE O/P EST MOD 30 MIN: CPT | Performed by: FAMILY MEDICINE

## 2023-09-22 NOTE — PATIENT INSTRUCTIONS
PATIENT INSTRUCTIONS      FOR KARLA Tovar 1940    DATE OF SERVICE: 2023        Apply Medihoney gel to both wounds    Cover with border gauze dressing, we will order a silicone border dressing which is easier to take off, you should expect a delivery to your home. Change the dressing on a daily basis      You may shower and clean the wound with soap and water but use saline spray after showering.     No soaking of the wound underwater    You may wear the spandagrip compression stocking in the daytime and remove at nighttime if desired      Follow up with Dr. Vivien Warren 2 [facility] [place] 10/6/23

## 2023-09-22 NOTE — PROGRESS NOTES
Weekly Wound Education Note    Teaching Provided To: Patient  Training Topics: Signs and symptoms of infection;Dressing;Edema control;Cleasing and general instructions;Skin care  Training Method: Demonstration;Explain/Verbal  Training Response: Patient responds and understands        Notes: Left leg- honey gel; kerlix; spandagrip

## 2023-10-02 ENCOUNTER — APPOINTMENT (OUTPATIENT)
Dept: WOUND CARE | Facility: HOSPITAL | Age: 83
End: 2023-10-02
Attending: FAMILY MEDICINE
Payer: MEDICARE

## 2023-10-06 ENCOUNTER — OFFICE VISIT (OUTPATIENT)
Dept: WOUND CARE | Facility: HOSPITAL | Age: 83
End: 2023-10-06
Attending: FAMILY MEDICINE
Payer: MEDICARE

## 2023-10-06 VITALS
DIASTOLIC BLOOD PRESSURE: 78 MMHG | RESPIRATION RATE: 18 BRPM | HEART RATE: 60 BPM | OXYGEN SATURATION: 94 % | SYSTOLIC BLOOD PRESSURE: 145 MMHG | TEMPERATURE: 98 F

## 2023-10-06 DIAGNOSIS — S81.802A UNSPECIFIED OPEN WOUND, LEFT LOWER LEG, INITIAL ENCOUNTER: Primary | ICD-10-CM

## 2023-10-06 DIAGNOSIS — Z85.89 HISTORY OF SQUAMOUS CELL CARCINOMA: ICD-10-CM

## 2023-10-06 DIAGNOSIS — L97.923 NON-PRESSURE CHRONIC ULCER OF LEFT LOWER LEG WITH NECROSIS OF MUSCLE (HCC): ICD-10-CM

## 2023-10-06 PROCEDURE — 99215 OFFICE O/P EST HI 40 MIN: CPT | Performed by: FAMILY MEDICINE

## 2023-10-06 RX ORDER — COLLAGENASE SANTYL 250 [ARB'U]/G
OINTMENT TOPICAL
Qty: 30 G | Refills: 0 | Status: SHIPPED | OUTPATIENT
Start: 2023-10-06

## 2023-10-06 NOTE — PATIENT INSTRUCTIONS
PATIENT INSTRUCTIONS      FOR KARLA Suarez 1940    DATE OF SERVICE: 10/6/2023        Apply a nickel thick layer of Santyl to all 4 wounds, I will send Santyl prescription to your pharmacy    Cover with saline moistened 4 x 4 gauze    Cover with a dry 4 x 4 gauze 1 to 2 pieces    Cover with gauze roll and secure with tape    You may use your netting to hold everything in place    Spandagrip stocking to lower extremity and may remove at bedtime        Follow up with Dr. Ana Lilia Ramos 2 WEEKS

## 2023-10-06 NOTE — PROGRESS NOTES
Weekly Wound Education Note    Teaching Provided To: Patient  Training Topics: Signs and symptoms of infection;Dressing;Edema control;Cleasing and general instructions;Skin care  Training Method: Demonstration;Explain/Verbal  Training Response: Patient responds and understands        Notes: Left leg- santyl and saline moistened gauze; kerlix; spandagrip    Educated patient on how to apply santyl nickel thick, moistened saline gauze on top and wrap with gauze roll. Compression sleeve to apply as well.

## 2023-10-20 ENCOUNTER — OFFICE VISIT (OUTPATIENT)
Dept: WOUND CARE | Facility: HOSPITAL | Age: 83
End: 2023-10-20
Attending: FAMILY MEDICINE
Payer: MEDICARE

## 2023-10-20 VITALS
RESPIRATION RATE: 18 BRPM | SYSTOLIC BLOOD PRESSURE: 125 MMHG | HEART RATE: 65 BPM | OXYGEN SATURATION: 98 % | DIASTOLIC BLOOD PRESSURE: 80 MMHG | TEMPERATURE: 98 F

## 2023-10-20 DIAGNOSIS — Z85.89 HISTORY OF SQUAMOUS CELL CARCINOMA: ICD-10-CM

## 2023-10-20 DIAGNOSIS — L97.923 NON-PRESSURE CHRONIC ULCER OF LEFT LOWER LEG WITH NECROSIS OF MUSCLE (HCC): Primary | ICD-10-CM

## 2023-10-20 PROCEDURE — 99214 OFFICE O/P EST MOD 30 MIN: CPT | Performed by: FAMILY MEDICINE

## 2023-10-20 NOTE — PATIENT INSTRUCTIONS
PATIENT INSTRUCTIONS      FOR Praveen Velazquez ,  1940    DATE OF SERVICE: 10/20/2023        Please see plastic surgeon of your choice    Plastic surgery, Gerber Shrestha a nickel thick layer of Santyl to all 4 wounds, I will send Santyl prescription to your pharmacy    Cover with saline moistened 4 x 4 gauze    Cover with a dry 4 x 4 gauze 1 to 2 pieces    Cover with gauze roll and secure with tape    You may use your netting to hold everything in place    Spandagrip stocking to lower extremity and may remove at bedtime        Follow up with Dr. Pam Augustin 2 WEEKS

## 2023-11-03 ENCOUNTER — APPOINTMENT (OUTPATIENT)
Dept: WOUND CARE | Facility: HOSPITAL | Age: 83
End: 2023-11-03
Attending: FAMILY MEDICINE
Payer: MEDICARE

## 2023-11-03 VITALS
OXYGEN SATURATION: 99 % | DIASTOLIC BLOOD PRESSURE: 74 MMHG | RESPIRATION RATE: 17 BRPM | SYSTOLIC BLOOD PRESSURE: 128 MMHG | HEART RATE: 64 BPM | TEMPERATURE: 97 F

## 2023-11-03 DIAGNOSIS — L97.923 NON-PRESSURE CHRONIC ULCER OF LEFT LOWER LEG WITH NECROSIS OF MUSCLE (HCC): Primary | ICD-10-CM

## 2023-11-03 DIAGNOSIS — Z85.89 HISTORY OF SQUAMOUS CELL CARCINOMA: ICD-10-CM

## 2023-11-03 PROCEDURE — 99215 OFFICE O/P EST HI 40 MIN: CPT | Performed by: FAMILY MEDICINE

## 2023-11-10 ENCOUNTER — OFFICE VISIT (OUTPATIENT)
Dept: WOUND CARE | Facility: HOSPITAL | Age: 83
End: 2023-11-10
Attending: FAMILY MEDICINE
Payer: MEDICARE

## 2023-11-10 VITALS
OXYGEN SATURATION: 94 % | TEMPERATURE: 98 F | DIASTOLIC BLOOD PRESSURE: 76 MMHG | HEART RATE: 84 BPM | SYSTOLIC BLOOD PRESSURE: 120 MMHG

## 2023-11-10 DIAGNOSIS — L97.923 NON-PRESSURE CHRONIC ULCER OF LEFT LOWER LEG WITH NECROSIS OF MUSCLE (HCC): Primary | ICD-10-CM

## 2023-11-10 DIAGNOSIS — Z85.89 HISTORY OF SQUAMOUS CELL CARCINOMA: ICD-10-CM

## 2023-11-10 PROCEDURE — 99214 OFFICE O/P EST MOD 30 MIN: CPT | Performed by: FAMILY MEDICINE

## 2023-11-10 NOTE — PATIENT INSTRUCTIONS
PATIENT INSTRUCTIONS      FOR KARLA Mcdermott 1940    DATE OF SERVICE: 11/10/2023      Fibracol Plus pack into wound base  NANCY     Leave in place for 1 week    DO NOT get wet.      Follow up with Dr. Belen To 1 WEEK

## 2023-11-17 ENCOUNTER — OFFICE VISIT (OUTPATIENT)
Dept: WOUND CARE | Facility: HOSPITAL | Age: 83
End: 2023-11-17
Attending: FAMILY MEDICINE
Payer: MEDICARE

## 2023-11-17 VITALS
DIASTOLIC BLOOD PRESSURE: 73 MMHG | HEART RATE: 52 BPM | SYSTOLIC BLOOD PRESSURE: 135 MMHG | OXYGEN SATURATION: 99 % | TEMPERATURE: 97 F | RESPIRATION RATE: 17 BRPM

## 2023-11-17 DIAGNOSIS — L97.923 NON-PRESSURE CHRONIC ULCER OF LEFT LOWER LEG WITH NECROSIS OF MUSCLE (HCC): Primary | ICD-10-CM

## 2023-11-17 DIAGNOSIS — Z85.89 HISTORY OF SQUAMOUS CELL CARCINOMA: ICD-10-CM

## 2023-11-17 PROCEDURE — 99215 OFFICE O/P EST HI 40 MIN: CPT | Performed by: FAMILY MEDICINE

## 2023-11-17 NOTE — PATIENT INSTRUCTIONS
PATIENT INSTRUCTIONS      FOR KARLA Neff 1940    DATE OF SERVICE: 2023        NANCY Neg. Pressure wound therapy    Leave in place for 1 week    DO NOT get wet.      Nurse visit in 1 week    Follow up with Dr. Lubna Funes 2 WEEKS

## 2023-11-24 ENCOUNTER — OFFICE VISIT (OUTPATIENT)
Dept: WOUND CARE | Facility: HOSPITAL | Age: 83
End: 2023-11-24
Attending: FAMILY MEDICINE
Payer: MEDICARE

## 2023-11-24 VITALS
RESPIRATION RATE: 17 BRPM | DIASTOLIC BLOOD PRESSURE: 72 MMHG | OXYGEN SATURATION: 96 % | TEMPERATURE: 98 F | HEART RATE: 61 BPM | SYSTOLIC BLOOD PRESSURE: 113 MMHG

## 2023-11-24 DIAGNOSIS — L97.923 NON-PRESSURE CHRONIC ULCER OF LEFT LOWER LEG WITH NECROSIS OF MUSCLE (HCC): Primary | ICD-10-CM

## 2023-11-24 PROCEDURE — 97607 NEG PRS WND THR NDME<=50SQCM: CPT

## 2023-11-24 NOTE — PROGRESS NOTES
Nurse Visit:    Patient here for NANCY dressing changes. No concerns today at this visit. 11/24/23 0819   Negative Pressure Wound Therapy Leg Left   Placement Date/Time: 11/03/23 9621   Location: Leg  Wound Location Orientation: Left   Wound photographed/measured Yes   Machine Status (On) Yes   Site Assessment Yellow;Brown   Tara-wound Assessment Hyperpigmented;Clean;Dry; Intact   Unit Type nancy   Dressing Type White foam   Number of Foam Pieces Used 1   Cycle Continuous; On   Target Pressure (mmHg) 75   Dressing Status Clean;Dry; Intact   Wound 09/01/23 1 Leg Left; Anterior;Proximal;Lower   Date First Assessed/Time First Assessed: 09/01/23 1338    Wound Number (Wound Clinic Only): 1  Location: Leg  Wound Location Orientation: Left; Anterior;Proximal;Lower   Wound Image    Site Assessment Moist;Yellow   Drainage Amount Moderate   Drainage Description Yellow;Green;Brown   Treatments Cleansed; Compression Sleeve   Dressing Wound vac sponge   Dressing Changed Changed   Dressing Status Dry;Clean; Intact   Wound Length (cm) 0.6 cm   Wound Width (cm) 0.4 cm   Wound Surface Area (cm^2) 0.24 cm^2   Wound Depth (cm) 0.2 cm   Wound Volume (cm^3) 0.048 cm^3   Wound Healing % 78   Margins Epibole (Rolled edges); Attached edges   Non-staged Wound Description Full thickness   Tara-wound Assessment Edema; Hyperpigmented   Wound Bed Granulation (%) 0 %   Wound Bed Epithelium (%) 0 %   Wound Bed Slough (%) 100 %   Wound Bed Eschar (%) 0 %   State of Healing Non-healing   Wound Odor None   Exposed Structure Tendon   Sinus Tracts? No     Weekly Wound Education Note    Teaching Provided To: Patient  Training Topics: Cleasing and general instructions;Dressing;Negative presssure therapy;Nutrition;Skin care              Notes: Nurse visit- NANCY ace wrap    Patient updated on dressing changes, size of the wound. Encouraged to continue with increase protein in the diet. Patient aware of next appointment.

## 2023-12-01 ENCOUNTER — OFFICE VISIT (OUTPATIENT)
Dept: WOUND CARE | Facility: HOSPITAL | Age: 83
End: 2023-12-01
Attending: FAMILY MEDICINE
Payer: MEDICARE

## 2023-12-01 VITALS
SYSTOLIC BLOOD PRESSURE: 135 MMHG | OXYGEN SATURATION: 95 % | HEART RATE: 57 BPM | RESPIRATION RATE: 17 BRPM | TEMPERATURE: 98 F | DIASTOLIC BLOOD PRESSURE: 79 MMHG

## 2023-12-01 DIAGNOSIS — L97.923 NON-PRESSURE CHRONIC ULCER OF LEFT LOWER LEG WITH NECROSIS OF MUSCLE (HCC): Primary | ICD-10-CM

## 2023-12-01 DIAGNOSIS — Z85.89 HISTORY OF SQUAMOUS CELL CARCINOMA: ICD-10-CM

## 2023-12-01 PROCEDURE — 99214 OFFICE O/P EST MOD 30 MIN: CPT | Performed by: FAMILY MEDICINE

## 2023-12-01 NOTE — PATIENT INSTRUCTIONS
PATIENT INSTRUCTIONS      FOR KARLA Reyes 1940    DATE OF SERVICE: 2023        NANCY Neg. Pressure wound therapy    Leave in place for 1 week    DO NOT get wet.      Follow up with Dr. Montana Colin 1 WEEK

## 2023-12-08 ENCOUNTER — OFFICE VISIT (OUTPATIENT)
Dept: WOUND CARE | Facility: HOSPITAL | Age: 83
End: 2023-12-08
Attending: FAMILY MEDICINE
Payer: MEDICARE

## 2023-12-08 VITALS
DIASTOLIC BLOOD PRESSURE: 76 MMHG | HEART RATE: 64 BPM | OXYGEN SATURATION: 96 % | SYSTOLIC BLOOD PRESSURE: 118 MMHG | RESPIRATION RATE: 17 BRPM | TEMPERATURE: 98 F

## 2023-12-08 DIAGNOSIS — Z85.89 HISTORY OF SQUAMOUS CELL CARCINOMA: ICD-10-CM

## 2023-12-08 DIAGNOSIS — L97.923 NON-PRESSURE CHRONIC ULCER OF LEFT LOWER LEG WITH NECROSIS OF MUSCLE (HCC): Primary | ICD-10-CM

## 2023-12-08 PROCEDURE — 99214 OFFICE O/P EST MOD 30 MIN: CPT | Performed by: FAMILY MEDICINE

## 2023-12-08 NOTE — PATIENT INSTRUCTIONS
PATIENT INSTRUCTIONS      FOR KARLA Peralta 1940    DATE OF SERVICE: 2023      On :    Moisten a strip Nayeli collagen and gently pack into the wound base    Cover with a piece of Hydrofera Blue dressing with the words facing outward    Secure with Band-Aid or tape    Do not get the wound wet, use a shower boot or cast cover when showering          Follow up with Dr. Damian Hernandez 1 WEEK

## 2023-12-15 ENCOUNTER — OFFICE VISIT (OUTPATIENT)
Dept: WOUND CARE | Facility: HOSPITAL | Age: 83
End: 2023-12-15
Attending: FAMILY MEDICINE
Payer: MEDICARE

## 2023-12-15 VITALS
SYSTOLIC BLOOD PRESSURE: 128 MMHG | TEMPERATURE: 97 F | RESPIRATION RATE: 18 BRPM | DIASTOLIC BLOOD PRESSURE: 80 MMHG | HEART RATE: 80 BPM | OXYGEN SATURATION: 99 %

## 2023-12-15 DIAGNOSIS — Z85.89 HISTORY OF SQUAMOUS CELL CARCINOMA: ICD-10-CM

## 2023-12-15 DIAGNOSIS — L97.923 NON-PRESSURE CHRONIC ULCER OF LEFT LOWER LEG WITH NECROSIS OF MUSCLE (HCC): Primary | ICD-10-CM

## 2023-12-15 PROCEDURE — 99214 OFFICE O/P EST MOD 30 MIN: CPT | Performed by: FAMILY MEDICINE

## 2023-12-19 ENCOUNTER — OFFICE VISIT (OUTPATIENT)
Dept: WOUND CARE | Facility: HOSPITAL | Age: 83
End: 2023-12-19
Attending: FAMILY MEDICINE
Payer: MEDICARE

## 2023-12-19 VITALS
OXYGEN SATURATION: 96 % | SYSTOLIC BLOOD PRESSURE: 121 MMHG | HEART RATE: 80 BPM | DIASTOLIC BLOOD PRESSURE: 77 MMHG | RESPIRATION RATE: 20 BRPM

## 2023-12-19 DIAGNOSIS — L97.922: Primary | ICD-10-CM

## 2023-12-19 NOTE — PROGRESS NOTES
Nurse Visit:    Patient seen as nurse visit today due to concerns about the dressing. Contacted Dr Franki Arango with wound assessment. Dressings changed to triad and gauze with tubigrip.        12/19/23 1411   Wound 09/01/23 1 Leg Left; Anterior;Proximal;Lower   Date First Assessed/Time First Assessed: 09/01/23 1338    Wound Number (Wound Clinic Only): 1  Location: Leg  Wound Location Orientation: Left; Anterior;Proximal;Lower   Wound Image    Site Assessment Yellow;Dry   Drainage Amount None   Treatments Wound    Dressing   (triad and gauze)   Dressing Changed Changed   Wound Length (cm) 0.1 cm   Wound Width (cm) 0.1 cm   Wound Surface Area (cm^2) 0.01 cm^2   Wound Depth (cm) 0.1 cm   Wound Volume (cm^3) 0.001 cm^3   Wound Healing % 100   Margins Poorly defined   Non-staged Wound Description Partial thickness   Tara-wound Assessment Edema; Hyperpigmented   Wound Bed Epithelium (%) 100 %   State of Healing Epithelialized   Wound Odor None   Sinus Tracts?  No       Weekly Wound Education Note    Teaching Provided To: Patient  Training Topics: Cleasing and general instructions;Nutrition;Dressing;Edema control;Skin care  Training Method: Explain/Verbal           Notes: left leg-triad and gauze tubigrip E

## 2023-12-22 ENCOUNTER — APPOINTMENT (OUTPATIENT)
Dept: WOUND CARE | Facility: HOSPITAL | Age: 83
End: 2023-12-22
Attending: FAMILY MEDICINE
Payer: MEDICARE

## 2023-12-29 ENCOUNTER — NURSE ONLY (OUTPATIENT)
Dept: WOUND CARE | Facility: HOSPITAL | Age: 83
End: 2023-12-29
Attending: FAMILY MEDICINE
Payer: MEDICARE

## 2023-12-29 VITALS
HEART RATE: 64 BPM | SYSTOLIC BLOOD PRESSURE: 144 MMHG | RESPIRATION RATE: 17 BRPM | OXYGEN SATURATION: 98 % | DIASTOLIC BLOOD PRESSURE: 79 MMHG | TEMPERATURE: 97 F

## 2023-12-29 DIAGNOSIS — L97.923 NON-PRESSURE CHRONIC ULCER OF LEFT LOWER LEG WITH NECROSIS OF MUSCLE (HCC): Primary | ICD-10-CM

## 2023-12-29 PROCEDURE — 99213 OFFICE O/P EST LOW 20 MIN: CPT | Performed by: NURSE PRACTITIONER

## 2024-01-05 ENCOUNTER — APPOINTMENT (OUTPATIENT)
Dept: WOUND CARE | Facility: HOSPITAL | Age: 84
End: 2024-01-05
Attending: FAMILY MEDICINE
Payer: MEDICARE

## 2024-01-12 ENCOUNTER — APPOINTMENT (OUTPATIENT)
Dept: WOUND CARE | Facility: HOSPITAL | Age: 84
End: 2024-01-12
Attending: NURSE PRACTITIONER
Payer: MEDICARE

## 2024-01-19 ENCOUNTER — APPOINTMENT (OUTPATIENT)
Dept: WOUND CARE | Facility: HOSPITAL | Age: 84
End: 2024-01-19
Attending: NURSE PRACTITIONER
Payer: MEDICARE

## (undated) DEVICE — SUT VICRYL 2-0 FS-1 J443H

## (undated) DEVICE — MINOR GENERAL: Brand: MEDLINE INDUSTRIES, INC.

## (undated) DEVICE — SKIN PREP TRAY 4 COMPARTM TRAY: Brand: MEDLINE INDUSTRIES, INC.

## (undated) DEVICE — SYRINGE BULB 50/CS 48/PLT: Brand: MEDEGEN MEDICAL PRODUCTS, LLC

## (undated) DEVICE — GAMMEX® PI HYBRID SIZE 7, STERILE POWDER-FREE SURGICAL GLOVE, POLYISOPRENE AND NEOPRENE BLEND: Brand: GAMMEX

## (undated) DEVICE — PENROSE DRAIN 12" X 3/8": Brand: CARDINAL HEALTH

## (undated) DEVICE — SOL NACL IRRIG 0.9% 1000ML BTL

## (undated) DEVICE — BANDAGE ROLL,100% COTTON, 6 PLY, LARGE: Brand: KERLIX

## (undated) DEVICE — SUCTION CANISTER, 3000CC,SAFELINER: Brand: DEROYAL

## (undated) DEVICE — YANKAUER SUCTION INSTRUMENT NO CONTROL VENT, BULB TIP, CLEAR: Brand: YANKAUER

## (undated) DEVICE — SUPER SPONGES,MEDIUM: Brand: KERLIX

## (undated) NOTE — LETTER
23      Patient: Myron Amador  : 1940 Visit date: 2023    Dear Ovi Anguiano,      I examined your patient in follow-up today. She is 27 days post left leg debridement with flap reconstruction. She is done well postoperatively. She has a persistent open area distally which is shrinking in size. I believe this will heal with aggressive local care, and I referred you to her for same. Thank you for your kind referral. If I may answer any questions, please feel free to contact me.      Sincerely,   Siddharth Amaya MD     CC:   No Recipients

## (undated) NOTE — LETTER
23      Patient: Carol Denton  : 1940 Visit date: 2023    Dear Patricia Jackson,      I examined your patient in consultation today. Is a complex left leg wound with the family and undermining. We will plan on debridement of all involved tissues, followed by dressing changes, in preparation for ultimate skin grafting. Thank you for your kind referral. If I may answer any questions, please feel free to contact me.      Sincerely,   Ulises Correa MD     CC:   No Recipients

## (undated) NOTE — LETTER
Date: 9/1/2023  Patient name: Lisbet Cantu  YOB: 1940  Medical Record Number: [id number]  Primary Coverage: Payor: [facility] MEDICARE / Plan: [facility] MEDICARE / Product Type: Medicare /   Secondary Coverage:   Insurance ID: [id number]  Patient Address: [address] [place] [address]  Telephone Information:   Home Phone [phone]   Mobile [phone]         Encounter Date: 9/1/2023  Provider: Matt Monae MD  Diagnosis: (K81.904O) Unspecified open wound, left lower leg, initial encounter  (primary encounter diagnosis)      Wound 09/01/23 1 Tibial Left; Anterior;Proximal (Active)   Date First Assessed/Time First Assessed: 09/01/23 1338    Wound Number (Wound Clinic Only): 1  Location: Tibial  Wound Location Orientation: Left; Anterior;Proximal      Assessments 7/25/2023  4:16 PM 9/1/2023  1:39 PM   Wound Image      Site Assessment Unable to assess --   Drainage Amount None --   Dressing Xeroform;Kerlix fluffs;Kerlix roll;Tape;Other --   Dressing Status Clean;Dry; Intact --   Wound Length (cm) -- 1.1 cm   Wound Width (cm) -- 0.5 cm   Wound Surface Area (cm^2) -- 0.55 cm^2   Wound Depth (cm) -- 0.4 cm   Wound Volume (cm^3) -- 0.22 cm^3   Undermining -- 6-12= 0.2       No associated orders. Wound 09/01/23 2 Tibial Left;Distal;Anterior (Active)   Date First Assessed/Time First Assessed: 09/01/23 1339    Wound Number (Wound Clinic Only): 2  Location: Tibial  Wound Location Orientation: Left;Distal;Anterior      Assessments 9/1/2023  1:39 PM   Wound Image     Site Assessment Moist;Yellow; Red   Drainage Amount Moderate   Drainage Description Serosanguineous   Wound Length (cm) 0.9 cm   Wound Width (cm) 0.6 cm   Wound Surface Area (cm^2) 0.54 cm^2   Wound Depth (cm) 0.2 cm   Wound Volume (cm^3) 0.108 cm^3   Undermining 6-12= 1.6 cm       No associated orders. Wound Information/Order:  Wound Number:  All wounds  Product: Ecylpse bordered foam; honey gel; saline; 4x4 dry gauze  Dispense: 15 days  Dressing Frequency:Change dressing daily and/or PRN    Was a Debridement performed: Yes, Debridement type: selective        9/5/2023  NPI [id number]

## (undated) NOTE — ED AVS SNAPSHOT
[facility] [address] [address]    Phone:  [phone]    Fax:  [facility]   MRN: [id number]    Department:  [facility] AND [facility] in [facility]   Date of Visit:  1/ not participate in your health insurance plan. This may result in a lower benefit level being available to you or other limited reimbursement.   The physician may seek payment directly from you for amounts other than your deductible, co-payment, or co-insu prescription right away and begin taking the medication(s) as directed.   If you believe that any of the medications or instructions on this list is different from what your Primary Care doctor has instructed you - please continue to take your medications a coverage. Patient [address] is a Federal Navigator program that can help with your Affordable Care Act coverage, as well as all types of Medicaid plans.   To get signed up and covered, please call [phone] and ask to get set up for an insuran